# Patient Record
(demographics unavailable — no encounter records)

---

## 2025-05-22 NOTE — PLAN
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 Epilepsy - last Seizure March 2022 - on carbamazepine and Zonisamide - follows with neurology  3. Neuropathy - on gabapentin - follows with neurology  4. Anxiety - on sertraline 225mg on abilify - Has establihsed care with psychiatrist    5. Nephrolithiasis - currently asymptomatic - s/p ureteral stent  - Follows with urology, Dr. Ledezma  6. Osteoporosis - on vitamin D supplementation - serum vitamin D level ordered - cmp ordered - Follows with endocrinology  7. Dermatitis - on hydrocortisone and ketoconazole cream  f/u for CPE

## 2025-05-22 NOTE — PHYSICAL EXAM
[No Acute Distress] : no acute distress [Well-Appearing] : well-appearing [Normal Sclera/Conjunctiva] : normal sclera/conjunctiva [EOMI] : extraocular movements intact [Normal Outer Ear/Nose] : the outer ears and nose were normal in appearance [Normal Nasal Mucosa] : the nasal mucosa was normal [No Respiratory Distress] : no respiratory distress  [No Accessory Muscle Use] : no accessory muscle use [Clear to Auscultation] : lungs were clear to auscultation bilaterally [Regular Rhythm] : with a regular rhythm [Normal S1, S2] : normal S1 and S2 [Soft] : abdomen soft [Non Tender] : non-tender [Non-distended] : non-distended [Normal Bowel Sounds] : normal bowel sounds [No CVA Tenderness] : no CVA  tenderness [Normal Affect] : the affect was normal [Normal Insight/Judgement] : insight and judgment were intact [Normal Rate] : normal rate  [de-identified] : trace b/l LE edema [de-identified] : +scoliosis  [de-identified] : +leg braces in place b/l LE  [de-identified] : +ammbulates with wheelchair

## 2025-05-22 NOTE — REVIEW OF SYSTEMS
If you are a smoker, it is important for your health to stop smoking. Please be aware that second hand smoke is also harmful.
[FreeTextEntry2] : as noted in HPI

## 2025-05-22 NOTE — HISTORY OF PRESENT ILLNESS
[FreeTextEntry1] : Follow Up [de-identified] : The patient is a 21yo male with pmhx of Anderman syndrome, epilepsy, anxiety, nephrolithiasis, osteoporosis who presents with his mother for a follow up. Feels well today  Follows with neurology at [facility] -- last seizure was March 2022 Follows with PM&R, Dr. Ding, for hx of Anderman Syndrome Follows with orthopedics for hx of contracture of ankles and hx of neuromuscular scoliosis Follows with urology Dr. Ledezma Follows with Endocrinology for hx of osteoporosis  Has established care with psych for hx of anxiety

## 2025-05-29 NOTE — END OF VISIT
[FreeTextEntry4] : This note was written by Cheli Kurtz on 05/29/2025 actively solely Lai Harkins M.D. I, Cheli Kurtz, am scribing for and in the presence of Lai Harkins M.D. in the following sections HISTORY OF PRESENT ILLNESS, PAST MEDICAL/FAMILY/SOCIAL HISTORY; REVIEW OF SYSTEMS; VITAL SIGNS; PHYSICAL EXAM; ASSESSMENT/PLAN. All medical record entries made by this scribe at , Lai Harkins M.D. direction and personally dictated by me on 05/29/2025. I personally performed the services described in the documentation, reviewed the documentation recorded by the scribe in my presence, and it accurately and completely records my words and actions.

## 2025-05-29 NOTE — HISTORY OF PRESENT ILLNESS
Pt met with [facility] this afternoon. Has a plan to go to a sober living house tomorrow. Feels anxious and excited about this. Visible on unit. Social with select peers. Denies SI/HI/AVH. Says his depression is unchanged due to circumstances but feels ready to leave. Reports broken up sleep last night. Hopeful to sleep better. [FreeTextEntry1] :     cc kidney stones  22 year old male that presents with kidney stones. Pt reports he passed a kidney stone. Pt started calcium supplements a year ago which pt's mother believes may have affected his bones and caused kidney stones. She states pt has stopped taking calcium supplements. She states pt's urine was dark intermittently which was reminiscent of kidney stones. Pt has been fine since he passed kidney stone and hasn't c/o pain since. Pt is f/u with endocrinologist for bones and neurologist.  Pt's mother reports pt drinks about 3-4 16oz bottles of water a day.  PMHx: Osteoporosis no h/o kidney stones  PSHx: Bilateral RASHI, Stifle repair surgery  ct KIDNEYS/URETERS: Left kidney delayed nephrogram and mild hydroureteronephrosis secondary to a 0.4 cm obstructing stone in the distal left ureter. Right lower pole 0.6 cm nonobstructing renal stone.  BLADDER: Underdistended  8/28 2 visits to ed last 2 days for right sided pain secondary to 7 mm right proc stone still has pain n/v no fever ucx neg  05/29/2025 cc dark urine  Pt is a 23 year old male presenting for dark urine. Pt reports for past few weeks urine has been darker.  Pt reports that for past two days the urine has been a very dark brown. Pt reports trying to increase water intake. Pt denies pain, dysuria.    scrotal sono Findings: Bilateral echogenic foci, right mid pole measures 2.6 mm and left mid pole measures 3.8 mm. However both foci do not demonstrate any shadowing nor twinkle artifact, possible calcificaiton. Both kidneys are normal in size and echogenicity without hydronephrosis, stones or solid masses visualized.

## 2025-05-29 NOTE — HISTORY OF PRESENT ILLNESS
[FreeTextEntry1] :     cc kidney stones  22 year old male that presents with kidney stones. Pt reports he passed a kidney stone. Pt started calcium supplements a year ago which pt's mother believes may have affected his bones and caused kidney stones. She states pt has stopped taking calcium supplements. She states pt's urine was dark intermittently which was reminiscent of kidney stones. Pt has been fine since he passed kidney stone and hasn't c/o pain since. Pt is f/u with endocrinologist for bones and neurologist.  Pt's mother reports pt drinks about 3-4 16oz bottles of water a day.  PMHx: Osteoporosis no h/o kidney stones  PSHx: Bilateral RASHI, Stifle repair surgery  ct KIDNEYS/URETERS: Left kidney delayed nephrogram and mild hydroureteronephrosis secondary to a 0.4 cm obstructing stone in the distal left ureter. Right lower pole 0.6 cm nonobstructing renal stone.  BLADDER: Underdistended  8/28 2 visits to ed last 2 days for right sided pain secondary to 7 mm right proc stone still has pain n/v no fever ucx neg  05/29/2025 cc dark urine  Pt is a 23 year old male presenting for dark urine. Pt reports for past few weeks urine has been darker.  Pt reports that for past two days the urine has been a very dark brown. Pt reports trying to increase water intake. Pt denies pain, dysuria.    scrotal sono Findings: Bilateral echogenic foci, right mid pole measures 2.6 mm and left mid pole measures 3.8 mm. However both foci do not demonstrate any shadowing nor twinkle artifact, possible calcificaiton. Both kidneys are normal in size and echogenicity without hydronephrosis, stones or solid masses visualized.

## 2025-06-10 NOTE — HISTORY OF PRESENT ILLNESS
[Mother] : mother [Home] : at home, [unfilled] , at the time of the visit. [Medical Office: ([facility])___] : at the medical office located in  [Telehealth (audio & video)] : This visit was provided via telehealth using real-time 2-way audio visual technology. [Verbal consent obtained from patient] : the patient, [unfilled] [FreeTextEntry1] : cc kidney stones  22 year old male that presents with kidney stones. Pt reports he passed a kidney stone. Pt started calcium supplements a year ago which pt's mother believes may have affected his bones and caused kidney stones. She states pt has stopped taking calcium supplements. She states pt's urine was dark intermittently which was reminiscent of kidney stones. Pt has been fine since he passed kidney stone and hasn't c/o pain since. Pt is f/u with endocrinologist for bones and neurologist.   Pt's mother reports pt drinks about 3-4 16oz bottles of water a day.  PMHx: Osteoporosis  no h/o kidney stones  PSHx: Bilateral RASHI, Stifle repair surgery  ct KIDNEYS/URETERS: Left kidney delayed nephrogram and mild hydroureteronephrosis secondary to a 0.4 cm obstructing stone in the distal left ureter. Right lower pole 0.6 cm nonobstructing renal stone.  BLADDER: Underdistended  8/28  2 visits to ed last 2 days for right sided pain secondary to 7 mm right proc stone  still has pain n/v  no fever ucx neg  05/29/2025 cc dark urine Pt is a 23 year old male presenting for dark urine. Pt reports for past few weeks urine has been darker. Pt reports that for past two days the urine has been a very dark brown. Pt reports trying to increase water intake. Pt denies pain, dysuria.   scrotal sono Findings: Bilateral echogenic foci, right mid pole measures 2.6 mm and left mid pole measures 3.8 mm. However both foci do not demonstrate any shadowing nor twinkle artifact, possible calcificaiton. Both kidneys are normal in size and echogenicity without hydronephrosis, stones or solid masses visualized.  6/5/25 cc f/u  reports passed small stone  urine clearing  taking potassium citrate powder from [employer]

## 2025-06-10 NOTE — ASSESSMENT
[FreeTextEntry1] : will cont citrate supplementation  will repeat 24 hr urine collection     Patient is being seen today for evaluation and management of a chronic and longitudinal ongoing condition and I am of the primary treating physician.